# Patient Record
Sex: FEMALE | Employment: FULL TIME | URBAN - METROPOLITAN AREA
[De-identification: names, ages, dates, MRNs, and addresses within clinical notes are randomized per-mention and may not be internally consistent; named-entity substitution may affect disease eponyms.]

---

## 2022-07-14 NOTE — PROGRESS NOTES
HPI: Bella Young (: 1975) is a 50 y.o. female, patient, here for evaluation of the following chief complaint(s): Patient presents with complaint of a new onset lump at the dorsal aspect of the left hand over the extensor tendon of the fourth metacarpal.  She states that recently she hit her hand really hard on a solid object and then noticed the appearance of the lump. It is painful if she makes contact with anything. No other complaints or concerns. X-rays of the left hand obtained today. Hand Pain (Left)       Vitals:  Ht 5' 7\" (1.702 m)   Wt 140 lb (63.5 kg)   BMI 21.93 kg/m²    Body mass index is 21.93 kg/m². Allergies   Allergen Reactions    Penicillin G Hives       Current Outpatient Medications   Medication Sig    sertraline (ZOLOFT) 100 mg tablet Take 2 po qd (Patient not taking: Reported on 7/15/2022)    busPIRone (BUSPAR) 30 mg tablet Take 1 po bid (Patient not taking: Reported on 7/15/2022)    clonazePAM (KLONOPIN) 0.5 mg tablet Take 1 to 2 po q 4-6 h prn anxiety (Patient not taking: Reported on 7/15/2022)     No current facility-administered medications for this visit. No past medical history on file. No past surgical history on file. No family history on file. Social History     Tobacco Use    Smoking status: Not on file    Smokeless tobacco: Not on file   Substance Use Topics    Alcohol use: Not on file    Drug use: Not on file        Review of Systems    Constitutional: No fevers, chills, night sweats, excessive fatigue or weight loss. Musculoskeletal: No joint pain, swelling or redness. No decreased range of motion. Neurologic: No headache, blurred vision, and no areas of focal weakness or numbness. Normal gait. No sensory problems. Respiratory: No dyspnea on exertion, orthopnea, chest pain, cough or hemoptysis.   Cardiovascular: No anginal chest pain, irregular heart beat, tachycardia, palpitations or orthopnea  Integumentary: No chronic rashes, inflammation, ulcerations, pruritus, petechiae, purpura, ecchymoses, or skin changes      Physical Exam    General: Alert, cooperative, no distress  Musculosketal: Left hand - Normal range of motion. Normal sensation. Edema, edema or warmth to the joints. There is a lump over the extensor tendon of the base of the fourth metacarpal.  Tender to palpation. Regular border with rubbery consistency. Neurologic:  CNII-XII intact, Normal strength, sensation, and reflexes throughout    XR Results (most recent):  Results from Appointment encounter on 07/15/22    XR HAND LT MIN 3 V    Narrative  Normal osseous and joint space findings. No fractures, deformities or erosions appreciated. ASSESSMENT/PLAN:  Below is the assessment and plan developed based on review of pertinent history, physical exam, labs, studies, and medications. New onset lump at the dorsal aspect of the left hand over the extensor tendon of the fourth metacarpal.  She states that recently she hit her hand really hard on a solid object and then noticed the appearance of the lump. It is painful if she makes contact with anything. Clinical exam is consistent with ganglion cyst of the dorsal aspect of the hand at the base of the fourth metacarpal.  Treatment options reviewed with the patient and she is opting for an aspiration. 0.5 cc of jelly-like material aspirated. She is aware the cyst can recur. Follow up in 3 to 4 weeks to review her progress as needed. 1. Ganglion cyst of tendon sheath of left hand  -     XR HAND LT MIN 3 V; Future  -     PUNCTURE DRAINAGE OF LESION  2. Left hand pain      Return in about 4 weeks (around 8/12/2022). Dr. Thedford Gottron was available for immediate consult during this encounter. An electronic signature was used to authenticate this note.   -- Silvia Dimas PA-C

## 2022-07-15 ENCOUNTER — OFFICE VISIT (OUTPATIENT)
Dept: ORTHOPEDIC SURGERY | Age: 47
End: 2022-07-15
Payer: COMMERCIAL

## 2022-07-15 VITALS — BODY MASS INDEX: 21.97 KG/M2 | HEIGHT: 67 IN | WEIGHT: 140 LBS

## 2022-07-15 DIAGNOSIS — M79.642 LEFT HAND PAIN: ICD-10-CM

## 2022-07-15 DIAGNOSIS — M67.442 GANGLION CYST OF TENDON SHEATH OF LEFT HAND: Primary | ICD-10-CM

## 2022-07-15 PROCEDURE — 99203 OFFICE O/P NEW LOW 30 MIN: CPT | Performed by: PHYSICIAN ASSISTANT

## 2022-07-15 PROCEDURE — 10160 PNXR ASPIR ABSC HMTMA BULLA: CPT | Performed by: PHYSICIAN ASSISTANT

## 2022-09-06 ENCOUNTER — OFFICE VISIT (OUTPATIENT)
Dept: ORTHOPEDIC SURGERY | Age: 47
End: 2022-09-06
Payer: COMMERCIAL

## 2022-09-06 VITALS — WEIGHT: 140 LBS | BODY MASS INDEX: 21.97 KG/M2 | HEIGHT: 67 IN

## 2022-09-06 DIAGNOSIS — M79.642 LEFT HAND PAIN: ICD-10-CM

## 2022-09-06 DIAGNOSIS — M67.442 GANGLION CYST OF TENDON SHEATH OF LEFT HAND: Primary | ICD-10-CM

## 2022-09-06 PROCEDURE — 99213 OFFICE O/P EST LOW 20 MIN: CPT | Performed by: PHYSICIAN ASSISTANT

## 2022-09-06 PROCEDURE — 10160 PNXR ASPIR ABSC HMTMA BULLA: CPT | Performed by: PHYSICIAN ASSISTANT

## 2022-09-06 RX ORDER — TRIAMCINOLONE ACETONIDE 40 MG/ML
40 INJECTION, SUSPENSION INTRA-ARTICULAR; INTRAMUSCULAR ONCE
Status: COMPLETED | OUTPATIENT
Start: 2022-09-06 | End: 2022-09-06

## 2022-09-06 RX ADMIN — TRIAMCINOLONE ACETONIDE 40 MG: 40 INJECTION, SUSPENSION INTRA-ARTICULAR; INTRAMUSCULAR at 16:29

## 2022-09-06 NOTE — PROGRESS NOTES
HPI: Leighann Hallman (: 1975) is a 50 y.o. female, patient, here for evaluation of the following chief complaint(s):Patient presents with complaint of a new onset lump at the dorsal aspect of the left hand over the extensor tendon of the fourth metacarpal.  She states that recently she hit her hand really hard on a solid object and then noticed the appearance of the lump. It is painful if she makes contact with anything. No other complaints or concerns. X-rays of the left hand obtained today. Patient presents in follow-up. She was last seen in the office on 7/15/2022 when she underwent an aspiration of the ganglion cyst at the dorsal aspect of the hand at the base of the fourth metacarpal.  She has done well until recently when she noticed slight recurrence. She presents today for subsequent aspiration. No other complaints or concerns. Hand Pain (Left )       Vitals:  Ht 5' 7\" (1.702 m)   Wt 140 lb (63.5 kg)   BMI 21.93 kg/m²    Body mass index is 21.93 kg/m². Allergies   Allergen Reactions    Penicillin G Hives       Current Outpatient Medications   Medication Sig    sertraline (ZOLOFT) 100 mg tablet Take 2 po qd (Patient not taking: Reported on 7/15/2022)    busPIRone (BUSPAR) 30 mg tablet Take 1 po bid (Patient not taking: Reported on 7/15/2022)    clonazePAM (KLONOPIN) 0.5 mg tablet Take 1 to 2 po q 4-6 h prn anxiety (Patient not taking: Reported on 7/15/2022)     Current Facility-Administered Medications   Medication    triamcinolone acetonide (KENALOG-40) 40 mg/mL injection 40 mg       No past medical history on file. No past surgical history on file. No family history on file. Review of Systems    Constitutional: No fevers, chills, night sweats, excessive fatigue or weight loss. Musculoskeletal: + Lump left hand  Neurologic: No headache, blurred vision, and no areas of focal weakness or numbness. Normal gait. No sensory problems.   Respiratory: No dyspnea on exertion, orthopnea, chest pain, cough or hemoptysis. Cardiovascular: No anginal chest pain, irregular heart beat, tachycardia, palpitations or orthopnea  Integumentary: No chronic rashes, inflammation, ulcerations, pruritus, petechiae, purpura, ecchymoses, or skin changes           Physical Exam    General: Alert, cooperative, no distress  Musculosketal:  Left hand - Normal range of motion. Normal sensation. Edema, edema or warmth to the joints. There is a lump over the extensor tendon of the base of the fourth metacarpal.  Tender to palpation. Regular border with rubbery consistency. Neurologic:  CNII-XII intact, Normal strength, sensation, and reflexes throughout    XR Results (most recent):  Results from Appointment encounter on 07/15/22    XR HAND LT MIN 3 V    Narrative  Normal osseous and joint space findings. No fractures, deformities or erosions appreciated. ASSESSMENT/PLAN:  Below is the assessment and plan developed based on review of pertinent history, physical exam, labs, studies, and medications. New onset lump at the dorsal aspect of the left hand over the extensor tendon of the fourth metacarpal.  She states that recently she hit her hand really hard on a solid object and then noticed the appearance of the lump. It is painful if she makes contact with anything. Clinical exam is consistent with ganglion cyst of the dorsal aspect of the hand at the base of the fourth metacarpal.  Treatment options reviewed with the patient and she is opting for an aspiration. 0.5 cc of jelly-like material aspirated. She is aware the cyst can recur. Patient presents in follow-up. She was last seen in the office on 7/15/2022 when she underwent an aspiration of the ganglion cyst at the dorsal aspect of the hand at the base of the fourth metacarpal.  She has done well until recently when she noticed slight recurrence. She presents today for subsequent aspiration.   Aspiration performed with < 0.25mL jelly-like material aspirated. The aspiration was followed by an injection of 1cc Triamcinolone. She is aware the cyst can recur. If so, she will consider surgical excision. Follow up in 3 to 4 weeks to review her progress as needed. Patient consent obtained prior to the injection(s). Consent forms signed. Discussed risks/benefits of cortisone injection and patient gave verbal consent. Under sterile conditions, the left hand ganglion cyst was aspirated with < 0.25mL aspirate obtained followed by an injection of 1cc Triamcinolone. She tolerated the procedure well. 1. Ganglion cyst of tendon sheath of left hand  -     PUNCTURE DRAINAGE OF LESION  -     triamcinolone acetonide (KENALOG-40) 40 mg/mL injection 40 mg; 40 mg, Other, ONCE, 1 dose, On Tue 9/6/22 at 1700  2. Left hand pain    Return in about 3 weeks (around 9/27/2022). Dr. Blade Romero was available for immediate consult during this encounter. An electronic signature was used to authenticate this note.   -- Yoanna Maciel PA-C

## 2023-11-30 ENCOUNTER — HOSPITAL ENCOUNTER (OUTPATIENT)
Facility: HOSPITAL | Age: 48
Setting detail: RECURRING SERIES
End: 2023-11-30
Payer: COMMERCIAL

## 2023-11-30 PROCEDURE — 97161 PT EVAL LOW COMPLEX 20 MIN: CPT

## 2023-11-30 PROCEDURE — 97112 NEUROMUSCULAR REEDUCATION: CPT

## 2023-11-30 PROCEDURE — 97535 SELF CARE MNGMENT TRAINING: CPT

## 2023-11-30 NOTE — THERAPY EVALUATION
Physical Therapy at [facility],   a part of [address] [place] [address], [facility] [address]  Phone: [phone]  Fax: [phone]     PHYSICAL THERAPY - EVALUATION/PLAN OF CARE NOTE (updated 3/23)    Date: 2023        Patient Name:  Naldo Ann :  1975   Medical   Diagnosis:  Abdominal distension (gaseous) [R14.0] Treatment Diagnosis:  M62.89  OTHER SPECIFIED DISORDERS OF MUSCLE    Referral Source:  Topher Mujica MD Provider #:  [id number]                Insurance: Payor: [employer] / Plan: Marlyne Britney / Product Type: *No Product type* /      Patient  verified yes     Visit #   Current  / Total 1 12   Time   In / Out 305p 359p   Total Treatment Time 54   Total Timed Codes 25     SUBJECTIVE  Pain Level (0-10 scale): 0  []constant []intermittent []improving []worsening []no change since onset    Any medication changes, allergies to medications, adverse drug reactions, diagnosis change, or new procedure performed?: [x] No    [] Yes (see summary sheet for update)  Medications: Verified on Patient Summary List    Subjective functional status/changes:       Feels like she never really empties bowels. Reports BMs daily, not straining, type 4. Take nutrafol and probiotics. Feels like she wipes a lot. Has 2 external and 2 internal hemorrhoids. Can be more exacerbated with celiac flare ups or a lot of IC. Some constipation with travel. Denies any complications with bladder function or leakage. Denies pain with IC.      Start of Care: 2023  Onset Date: chronic  Current symptoms/Complaints: incomplete BMs  Mechanism of Injury: unknown  PLOF: not really active- works full time and takes care of 2 children  Limitations to PLOF/Activity or Recreational Limitations: time  Work Hx: HR  Living Situation: children  Mobility: ind  Self Care: ind  Previous Treatment/Compliance: supplements  PMHx/Surgical Hx/Comorbidites: celiacs, 2 c sections  Prior Hospitalization: na  Barriers: []pain []Financial []time []transportation []Other:  Substance use: []Alcohol []Tobacco []other:   Pt Goals: to learn more about how to manage my symptoms when I travel  Motivation: motivated  Cognition: A & O x 4           OBJECTIVE    Lumbar ROM  R  L    Flexion   100%   Extension  100%  Side bending  100%  100%  Rotation  100%  100%    Lower Quarter MMT  R  L    Hip Flex   5/5  5/5  Hip ABD   5/5  5/5  Hip ADD   5/5  5/5  Hip Ext    NT    Special Tests:   Trendelenburg: R + L+   ASLR: R + L -    Pelvic exam deferred    Objective/Functional Outcome Measure: Bowel FOTO Score: 81  FOTO score = an established functional score where 100 = no disability    29 min [x]Eval - untimed                      Therapeutic Procedures: Tx Min Billable or 1:1 Min (if diff from Tx Min) Procedure, Rationale, Specifics   15 15 [id number] Self Care/Home Management (timed):  improve patient knowledge and understanding of positioning, home safety, activity modification, diagnosis/prognosis, and physical therapy expectations, procedures and progression  to improve patient's ability to progress to PLOF and address remaining functional goals. (see flow sheet as applicable)    Details if applicable:    Patient instructed in the role of physical therapy in the evaluation and treatment of pelvic floor dysfunction, applicable treatment modalities discussed. Expectations for regular home exercise participation and expected visit frequency in to achieve the patient's goals were discussed. Patient instructed in pelvic floor anatomy and function including levator ani, puborectalis and superficial pelvic  musculature. Patient was provided dietary information to improve stool quality including increasing soluble fiber intake (Bran Buds), probiotics (Activia yogurt) and increased water intake (6-8 glasses a day). Pt instructed in use of [place] stool chart to track progress.      Patient educated in